# Patient Record
Sex: FEMALE
[De-identification: names, ages, dates, MRNs, and addresses within clinical notes are randomized per-mention and may not be internally consistent; named-entity substitution may affect disease eponyms.]

---

## 2019-06-25 PROBLEM — Z00.00 ENCOUNTER FOR PREVENTIVE HEALTH EXAMINATION: Status: ACTIVE | Noted: 2019-06-25

## 2019-07-23 ENCOUNTER — APPOINTMENT (OUTPATIENT)
Dept: VASCULAR SURGERY | Facility: CLINIC | Age: 59
End: 2019-07-23

## 2019-07-23 ENCOUNTER — APPOINTMENT (OUTPATIENT)
Dept: VASCULAR SURGERY | Facility: CLINIC | Age: 59
End: 2019-07-23
Payer: COMMERCIAL

## 2019-07-23 VITALS — HEIGHT: 69 IN | WEIGHT: 140 LBS | BODY MASS INDEX: 20.73 KG/M2

## 2019-07-23 DIAGNOSIS — Z87.39 PERSONAL HISTORY OF OTHER DISEASES OF THE MUSCULOSKELETAL SYSTEM AND CONNECTIVE TISSUE: ICD-10-CM

## 2019-07-23 DIAGNOSIS — Z82.49 FAMILY HISTORY OF ISCHEMIC HEART DISEASE AND OTHER DISEASES OF THE CIRCULATORY SYSTEM: ICD-10-CM

## 2019-07-23 DIAGNOSIS — R25.2 CRAMP AND SPASM: ICD-10-CM

## 2019-07-23 DIAGNOSIS — I83.10 VARICOSE VEINS OF UNSPECIFIED LOWER EXTREMITY WITH INFLAMMATION: ICD-10-CM

## 2019-07-23 PROCEDURE — 93970 EXTREMITY STUDY: CPT

## 2019-07-23 PROCEDURE — 99213 OFFICE O/P EST LOW 20 MIN: CPT

## 2019-07-23 NOTE — PROCEDURE
[FreeTextEntry1] : Rx given for thigh high support hose 20-30 mmHg - pt to follow up for b/l gsv evlt, possible stab phlebectomy

## 2019-07-23 NOTE — ADDENDUM
[FreeTextEntry1] : CEAP Classification:\par []     C0----No visible or palpable signs of venous disease\par []     C1----Telangiectasia or reticular veins\par x   C2----Varicose veins; distinguished from reticular veins by a diameter of 3mm or more.\par []     C3----Edema\par []     Y6y--Nbnsrba in skin and subcutaneous tissues secondary to CVD---Pigmentation or eczema\par []     Q3o--Hjhhvnz in skin and subcutaneous tissues secondary to CVD---Lipodermatosclerosis or atrophic joe\par []     C5----Healed venous ulcer\par []     C6----Active venous ulcer\par x   S ----Symptoms including ache, pain, tightness, skin irritation, heaviness, muscle cramps, and other venous dysfunction\par []     A ----Asymptomatic\par CEAP Sclore  =    C2, S\par \par Attribute                            Absent=0                    Mild=1                                   Moderate=2                                           Severe=3\par 1         Pain                          None                          Occasional                           Daily, moderate                                      Daily, severe\par          \par 1        Varicose Veins         None                          Few: branch VV                  Multiple: GS VV                                      Extensive: GS & LS   \par \par  0       Venous Edema         None                         Evening ankle only                Afternoon above ankle                          Morning above ankle\par \par  0       Skin Pigmentation      None or low             Diffuse, limited, old brown    Diffuse, lower 1/3, recent pigment        Above lower 1/3, and recent pigment\par \par  0       Inflammation              None                        Mild cellulitis                           Moderate cellulitis, lower 1/3                 Severe cellulitis, lower 1/3 & above\par \par   0      Induration                  None                        Focal (<5 cm)                        Medial or lateral, < lower 1/3                  Entire lower 1/3   \par \par  0       No.of active ulcers   0                              1                                             2                                                             >2\par \par  0       Active ulceration      None                       < 3 months                             > 3 months, < 1 year                              Not healed > 1 year\par \par   0      Active ulcer, size     None                       < 2 cm diameter                     2-6 cm diameter                                      > 6 cm diameter      \par \par  0       Compressive tx        No use/compliant    Intermittent use                      Wears most days                                   Full compliance\par \par \par 2      TOTAL (max 30 pts) Venous Clinical Severity Score\par

## 2019-07-23 NOTE — REASON FOR VISIT
[Initial Evaluation] : an initial evaluation [FreeTextEntry1] : She was referred by her PMD, Dr. Johan Zimmerman, for evaluation of varicose veins b/l legs.  Patient with c/o painful dilated branch varicose veins, rt>>left.  She does not wear compression stockings and no history of venous treatment.  Significant family history of varicose veins - mother and father.  B/L VLE today confirms no DVT, no SVT, + reflux Bilateral GSV.  Reviewed results of venous US and copy of report given to patient.  She has severe dilated branch varicose veins Bilateral thigh and Left posterior leg.  Recommend thigh high medical grade 20-30 mmHg compression stockings to be worn daily and during flights.  Provided patient with prescription. She would benefit from Bilateral GSV EVLT followed by Bilateral leg microphlebectomy.  She will call the office when she is ready to schedule EVLT procedures.

## 2019-07-23 NOTE — ASSESSMENT
[Arterial/Venous Disease] : arterial/venous disease [Other: _____] : [unfilled] [FreeTextEntry1] : Vascular surgeon discussed with the patient:\par Varicose veins are enlarged, twisted veins. Varicose veins are caused by increased blood pressure in the veins.  The blood moves towards the heart by 1-way valves in the veins. When the valves become weakened or damaged, blood can collect in the veins. This causes the veins to become enlarged. Sitting or standing for long periods can cause blood to pool in the leg veins, increasing the pressure within the veins. The veins can stretch from the increased pressure. This may weaken the walls of the veins and damage the valves.\par Varicose veins may be more common in some families (inherited).  Factors that may increase pressure include:  obesity, older age, being female, pregnancy, taking oral contraceptive pills or hormone replacement, being inactive, and/or smoking. \par The most common symptoms of varicose veins are sensations in the legs, such as a heavy feeling, burning, and/or aching. However, each individual may experience symptoms differently.  Other symptoms may include:  color changes in the skin, sores on the legs, or rash.  Severe varicose veins may eventually produce long-term mild swelling that can result in more serious skin and tissue problems, such as ulcers and non-healing sores.\par Varicose veins are diagnosed by a complete medical history, physical examination, and diagnostic studies for varicose veins including duplex ultrasound and color-flow imaging.  \par Medical treatment for varicose veins include:  compression stockings, sclerotherapy, endovenous laser ablation and/or surgical treatment microphlebectomy.  \par \par

## 2019-07-23 NOTE — PHYSICAL EXAM
[2+] : right 2+ [Ankle Swelling (On Exam)] : not present [Varicose Veins Of Lower Extremities] : bilaterally [Ankle Swelling Bilaterally] : severe [] : bilaterally [Purpura] : no purpura  [Petechiae] : no petechiae [No Rash or Lesion] : No rash or lesion [Skin Induration] : no induration [Skin Ulcer] : no ulcer [Alert] : alert [Oriented to Time] : oriented to time [Oriented to Person] : oriented to person [Oriented to Place] : oriented to place [de-identified] : thin wdwn nad [FreeTextEntry1] : b/l varicose vein branches both legs [Calm] : calm [de-identified] : as above [de-identified] : wnl

## 2019-09-12 ENCOUNTER — OTHER (OUTPATIENT)
Age: 59
End: 2019-09-12

## 2019-11-25 ENCOUNTER — CLINICAL ADVICE (OUTPATIENT)
Age: 59
End: 2019-11-25

## 2019-11-26 ENCOUNTER — APPOINTMENT (OUTPATIENT)
Dept: VASCULAR SURGERY | Facility: CLINIC | Age: 59
End: 2019-11-26
Payer: COMMERCIAL

## 2019-11-26 VITALS
TEMPERATURE: 99 F | HEART RATE: 48 BPM | DIASTOLIC BLOOD PRESSURE: 78 MMHG | OXYGEN SATURATION: 98 % | SYSTOLIC BLOOD PRESSURE: 125 MMHG

## 2019-11-26 PROCEDURE — 36478 ENDOVENOUS LASER 1ST VEIN: CPT | Mod: RT

## 2019-11-26 NOTE — PROCEDURE
[FreeTextEntry2] : \par Right Lower extremity varicose veins with inflammation, leg pain, leg swelling, and leg cramping.  Venous insufficiency, chronic venous hypertension and venous reflux. [FreeTextEntry1] : Right gsv  endovenous laser ablation. \par  [FreeTextEntry3] : Ms. TIFFANIE REDMAN is a 59 year year old F with a history of   right  lower extremity varicose veins previously seen in the office.  Ultrasound examination demonstrated reflux in the right gsv vein dumping into the patient’s varicosities in the right leg.  A trial of compression stockings, exercise, elevation, and pain medication was attempted without relief and as such, this patient was offered endovenous laser ablation therapy.  After explaining risks and benefits, informed consent was obtained and the patient agreed to proceed.  \par \par The patient was escorted to the procedure room and placed in the  supine  position on the examination table.  Laser safety glasses were placed on the patient.  The  right leg was prepped and draped in the usual sterile fashion and time-out was called.  A sonographic probe was placed into a sterile sheath and the right lower extremity was imaged.  The  right gsv   vein was identified under sonographic guidance and 1 mL of 1% lidocaine was administered subcutaneously using a 25G needle.  Using standard Seldinger technique, access to the rihgt gsv  vein with the needle and corresponding guidewire was obtained.  The 4Fr Naeem-Sheath introducer was advanced over the wire to the treatment location.  The position of the sheath tip is 2 cm below the Sapheno   []   Junction and verified using ultrasound guidance.  Dilator and guidewire removed.   NeverTouch laser fiber inserted into the sheath until the Fiber Stop Assembly came in contact with the end of the Sheath Hub.  Fiber stop assembly removed and pulled the sheath back and locked to the Sheath-Mark fitting.  Fiber location confirmed using ultrasound guidance.\par \par Local tumescent anesthesia under ultrasound guidance was administered to ensure delivery of just enough anesthesia, approximately 250 mL, to achieve thermal protection.\par Anesthesia:  Tumescent anesthesia.  Tumescent anesthesia:  250 mL 0.9% Sodium Chloride for injection poured into sterile blue basin and added 83 mL sterile injectable lidocaine 1% (without Epinephrine) using 60 mL syringe.\par \par Laser was set to continuous mode and adjusted to desired power.  Laser placed in Enable mode.  The laser was activated by depressing the foot pedal while withdrawing the fiber and sheath together at a rate of 1 cm per 5 seconds.  The operating of the laser was ceased by removing the foot from foot pedal when the fiber end was 2 - 3 cm from the access site as indicated by markers on the distal tip of the Naeem-Sheath Introducer.   The sheath and laser fiber was removed and manual pressure applied at access site for 5 minutes.  Post procedure, the vein was imaged and showed successful closure of the   right gsv  vein.  Dry dressing applied to access site, compression stocking 20 - 30 mm Hg applied to treated extremity.  The patient tolerated the procedure well with no complications.  Vital signs stable, patient was monitored throughout procedure.  The patient was escorted to the changing room.  \par \par EVLT procedure start time    341 pm\par EVLT procedure end time     356 pm \par Guzmán    9 \par Length  32   cm\par Energy  1358    Joule\par Time    151    sec\par \par Post-Op Instructions:  The following instructions were reviewed with the patient and a print out of the instructions provided to patient at time of visit:   1)  Compression stocking to be worn 24 hours per day x 7 days.  2)  Do not get the stocking wet for the first 3 days.  3)  Ambulation immediately following procedure and as much as possible for the first 7 days.  4)  Contact the office if any questions or concerns. 5)  Patient must follow-up within the first week for post procedure reflux study performed in office.  Reviewed with the patient the follow up visit is to ensure that there are no complications such as a deep vein thrombosis (DVT).  Patient acknowledged understanding of post-op instructions and was provided with print out of instructions at time of visit.  \par \par \par

## 2019-11-26 NOTE — REASON FOR VISIT
[Procedure: _________] : a [unfilled] procedure visit [FreeTextEntry1] : Patient has severe reflux right gsv and here for right gsv EVLT procedure.  TIFFANIE REDMAN proceeded with right gsv EVLT with no complications.  TIFFANIE REDMAN will follow up in one week for routine visit with VLE.

## 2019-11-26 NOTE — ADDENDUM
[FreeTextEntry1] : RN reviewed with the patient post-procedure endovenous laser ablation (EVLT) instructions, provided patient with printed copy of instructions along with Dr. Khalil's cell number, and advised patient to call for any questions or concerns.\par \par Post-Endovenous Laser Ablation Therapy (EVLT) \par \par You have completed your EVLT and are on your way to better lower extremity venous health.  A medical-grade compression stocking has been placed on your leg to provide important continued support and external compression following the procedure.  \par \par The stocking is to remain in place for the next 7 days and is to be worn 24 hours/day.  This means that you need to sleep with the stocking, too.\par \par For the first three days, you cannot get the stocking wet.  Protect the stocking from getting wet.  If it gets slightly wet, let it air dry or dry with a blow dryer.  After 3 days, you may remove the stocking only to shower/bathe but then-you’ll need to replace the stocking immediately afterwards.  After 7 days you may discontinue use the support hose. \par \par You should expect to feel tightness, pulling, aching in the leg where the treated vein underwent ablation.  This is normal and represents the desired inflammatory response and the closing of the vein. Most people experience the onset of this discomfort after 2-3 days.  It may last 1-2 weeks and will get progressively better each day.  Take Motrin or lbuprofen or Aleve to help ease the discomfort.  You may also notice bruising of the skin- this is also normal and will resolve without difficulty.  Most patients who are scheduled to undergo EVLT on both legs are comfortable and more than ready to proceed with the second procedure after 2 weeks.  If it is necessary to reschedule, please notify the office.\par \par For the first week following your EVLT, avoid heavy exercise.  However, it is important that you walk as much as possible immediately after the procedure and each day for the first week.  Walking outside or on an indoor treadmill for 1 hour are both acceptable.  It is critical NOT to go home and remain sedentary following the procedure.\par \par You should immediately call your doctor if you experience:  fever, redness/blanching of the skin, excessive pain, swelling of the leg or for any concerns that you want to discuss.  \par \par You must be seen in the doctor's office for a follow-up evaluation within the first week after your EVLT and you can expect to undergo a quick ultrasound exam in the office.  This is an important but brief visit to ensure that there are no complications such as a deep vein thrombosis (blood clot). \par  \par Please contact our office to schedule your follow up appointment [phone] if you do not already have a scheduled appointment.  \par \par Dr. Khalil’s cell phone number:  [phone]\par \par \par

## 2019-12-03 ENCOUNTER — APPOINTMENT (OUTPATIENT)
Dept: VASCULAR SURGERY | Facility: CLINIC | Age: 59
End: 2019-12-03
Payer: COMMERCIAL

## 2019-12-03 DIAGNOSIS — R25.2 CRAMP AND SPASM: ICD-10-CM

## 2019-12-03 PROCEDURE — 93971 EXTREMITY STUDY: CPT

## 2019-12-03 PROCEDURE — 99214 OFFICE O/P EST MOD 30 MIN: CPT | Mod: 25

## 2019-12-03 NOTE — ASSESSMENT
[Other: _____] : [unfilled] [FreeTextEntry1] : Right  leg stab phlebectomy (30311)\par \par Varicose vein of right lower extremities with inflammation (I83.11);  Varicose veins with complication (I83.893); Venous/reflux insufficiency(I87.2); Leg pain (right, left) (M79.604, M79.605); Swelling of limb (M79.89); Cramps of limb (R25.2); Chronic venous hypertension with other complication (I87.399)\par \par Ms. TIFFANIE REDMAN is a 59 year year old F with complaints of large painful right leg branch varicose veins.  The patient has tried medical grade support hose 20-30 mmHg, periodic elevation, exercise, avoidance of prolonged immobility and pain medication for several months without success.  She is s/p right gsv evlt proceure.The patient has persistent symptoms of leg pain, aches, cramps, burning, itching, restlessness, especially with prolonged standing and sitting - symptoms interfere with the ability to function well throughout the day.  Detailed bilateral leg venous ultrasound reveals no truncal vein reflux.  However, there is evidence of right   leg enlarged branch varicose veins.  The patient requires stab phlebectomy in the doctor’s office to remove the large painful varicose vein branches from the patient’s right leg. \par Reviewed with the patient that microphlebectomy procedure is a method of removing varicose veins on the surface of the legs.  It is done in the office under local anesthesia.  This procedure involves making tiny incisions through which the varicose veins are removed.  There is rarely pain, but an occasional sensation of "tugging and pulling," should be expected.  After treatment, a compression bandage is worn for 72 hours and the treated leg must remain completely dry.  Temporary bruising and swelling of the treated area is typical and is minimized with compression bandage.  The patient can walk immediately after surgery and carry on normal daily activities except for strenuous exercise and heavy lifting.  The patient must follow the activity restrictions and wear the compression bandage as directed.\par \par

## 2019-12-03 NOTE — PHYSICAL EXAM
[2+] : right 2+ [Ankle Swelling (On Exam)] : not present [Varicose Veins Of Lower Extremities] : bilaterally [Ankle Swelling Bilaterally] : severe [] : bilaterally [No Rash or Lesion] : No rash or lesion [Purpura] : no purpura  [Skin Induration] : no induration [Petechiae] : no petechiae [Skin Ulcer] : no ulcer [Alert] : alert [Oriented to Place] : oriented to place [Oriented to Person] : oriented to person [Calm] : calm [Oriented to Time] : oriented to time [de-identified] : thin wdwn nad [de-identified] : as above [de-identified] : wnl [FreeTextEntry1] : b/l varicose vein branches both legs =- right medial thigh bruising c/w recent rt gsv evlt

## 2019-12-03 NOTE — REASON FOR VISIT
[Follow-Up: _____] : a [unfilled] follow-up visit [FreeTextEntry1] : She is s/p right gsv  EVLT on  11/26/2019.  Patient is here today for routine follow up with right VLE.  She has mild ecchymosis noted right medial thigh; reviewed with her the bruising is normal and will resolve over time.  VLE today confirms no DVT and right gsv closed consistent with prior laser ablation.  She has dilated branch varicose veins right leg that has slightly decreased in size since evlt procedure.  She would benefit from right leg microphlebectomy and would like to proceed procedure in 2 weeks if the veins right leg do not get smaller.  She will call to schedule appointment.

## 2019-12-11 ENCOUNTER — APPOINTMENT (OUTPATIENT)
Dept: VASCULAR SURGERY | Facility: CLINIC | Age: 59
End: 2019-12-11

## 2019-12-17 ENCOUNTER — APPOINTMENT (OUTPATIENT)
Dept: VASCULAR SURGERY | Facility: CLINIC | Age: 59
End: 2019-12-17

## 2019-12-18 ENCOUNTER — APPOINTMENT (OUTPATIENT)
Dept: VASCULAR SURGERY | Facility: CLINIC | Age: 59
End: 2019-12-18

## 2023-09-26 ENCOUNTER — APPOINTMENT (OUTPATIENT)
Dept: ORTHOPEDIC SURGERY | Facility: CLINIC | Age: 63
End: 2023-09-26
Payer: COMMERCIAL

## 2023-09-26 VITALS — BODY MASS INDEX: 19.99 KG/M2 | RESPIRATION RATE: 16 BRPM | WEIGHT: 135 LBS | HEIGHT: 69 IN

## 2023-09-26 DIAGNOSIS — M65.342 TRIGGER FINGER, LEFT RING FINGER: ICD-10-CM

## 2023-09-26 DIAGNOSIS — M72.0 PALMAR FASCIAL FIBROMATOSIS [DUPUYTREN]: ICD-10-CM

## 2023-09-26 PROCEDURE — 20550 NJX 1 TENDON SHEATH/LIGAMENT: CPT

## 2023-09-26 PROCEDURE — 73120 X-RAY EXAM OF HAND: CPT | Mod: 50

## 2023-09-26 PROCEDURE — 99203 OFFICE O/P NEW LOW 30 MIN: CPT | Mod: 25

## 2023-11-30 ENCOUNTER — APPOINTMENT (OUTPATIENT)
Dept: VASCULAR SURGERY | Facility: CLINIC | Age: 63
End: 2023-11-30
Payer: COMMERCIAL

## 2023-11-30 VITALS
HEART RATE: 55 BPM | SYSTOLIC BLOOD PRESSURE: 110 MMHG | OXYGEN SATURATION: 99 % | TEMPERATURE: 97 F | WEIGHT: 135 LBS | BODY MASS INDEX: 19.99 KG/M2 | HEIGHT: 69 IN | DIASTOLIC BLOOD PRESSURE: 70 MMHG

## 2023-11-30 DIAGNOSIS — I83.813 VARICOSE VEINS OF BILATERAL LOWER EXTREMITIES WITH PAIN: ICD-10-CM

## 2023-11-30 DIAGNOSIS — I83.893 VARICOSE VEINS OF BILATERAL LOWER EXTREMITIES WITH OTHER COMPLICATIONS: ICD-10-CM

## 2023-11-30 PROCEDURE — 93970 EXTREMITY STUDY: CPT

## 2023-11-30 PROCEDURE — 99204 OFFICE O/P NEW MOD 45 MIN: CPT | Mod: 25

## 2024-01-11 ENCOUNTER — APPOINTMENT (OUTPATIENT)
Dept: VASCULAR SURGERY | Facility: CLINIC | Age: 64
End: 2024-01-11

## 2024-01-18 ENCOUNTER — APPOINTMENT (OUTPATIENT)
Dept: VASCULAR SURGERY | Facility: CLINIC | Age: 64
End: 2024-01-18

## 2024-01-25 ENCOUNTER — APPOINTMENT (OUTPATIENT)
Dept: VASCULAR SURGERY | Facility: CLINIC | Age: 64
End: 2024-01-25

## 2024-02-01 ENCOUNTER — APPOINTMENT (OUTPATIENT)
Dept: VASCULAR SURGERY | Facility: CLINIC | Age: 64
End: 2024-02-01

## 2024-02-08 ENCOUNTER — APPOINTMENT (OUTPATIENT)
Dept: VASCULAR SURGERY | Facility: CLINIC | Age: 64
End: 2024-02-08

## 2024-02-15 ENCOUNTER — APPOINTMENT (OUTPATIENT)
Dept: VASCULAR SURGERY | Facility: CLINIC | Age: 64
End: 2024-02-15

## 2024-02-22 ENCOUNTER — APPOINTMENT (OUTPATIENT)
Dept: VASCULAR SURGERY | Facility: CLINIC | Age: 64
End: 2024-02-22

## 2024-02-29 ENCOUNTER — APPOINTMENT (OUTPATIENT)
Dept: VASCULAR SURGERY | Facility: CLINIC | Age: 64
End: 2024-02-29
Payer: MEDICARE

## 2024-02-29 VITALS
TEMPERATURE: 97.2 F | DIASTOLIC BLOOD PRESSURE: 71 MMHG | SYSTOLIC BLOOD PRESSURE: 107 MMHG | HEART RATE: 69 BPM | OXYGEN SATURATION: 98 %

## 2024-02-29 DIAGNOSIS — I87.2 VENOUS INSUFFICIENCY (CHRONIC) (PERIPHERAL): ICD-10-CM

## 2024-02-29 PROCEDURE — 36475 ENDOVENOUS RF 1ST VEIN: CPT | Mod: RT

## 2024-02-29 NOTE — REASON FOR VISIT
[Procedure: _________] : a [unfilled] procedure visit [FreeTextEntry1] : Patient has severe reflux right below knee gsv and here for right gsv RFA procedure.  TIFFANIE REDMAN proceeded with right below knee gsv RFA with no complications.  TIFFANIE REDMAN will follow up in one week for routine visit with VLE.

## 2024-02-29 NOTE — PROCEDURE
[FreeTextEntry1] : right below knee gsv RFA [FreeTextEntry3] :  Right lower extremity varicose veins with inflammation, fatigue, heaviness, pain, swelling, cramping heaviness, and dermatitis.  Pt was diagnosed with Chronic Venous insufficiency (CVI), chronic venous hypertension and venous reflux which requires thermal ablation with endovenous radiofrequency to treat the truncal/axial vein reflux (Radiofrequency Ablation, RFA).  Ultrasound examination demonstrated reflux in the supine vein with reflux into the patient's varicosities.  A trial of compression stockings, exercise, elevation, and pain medication was attempted without relief and as such, this patient was offered RFA as definitive treatment.  After explaining risks and benefits, informed consent was obtained and the patient was brought to the treatment room.  The patient was escorted to the procedure room and placed in the supine position on the examination table.   The right leg was prepped and draped in the usual sterile fashion and time-out was called.  A sonographic probe was placed into a sterile sheath and the lower extremity was imaged. A suitable access site in the superficial truncal vein to be treated was identified using sonographic guidance and marked at the skin level. 1 mL of 1% lidocaine without epinephrine was administered subcutaneously using a 25G needle.  The RF catheter, dilator and introducer were flushed with saline and wiped down and placed on the sterile field.  Using standard Seldinger technique, access to the saphenous vein was obtained with the needle and corresponding guidewire.  The needle was removed and the 7Fr introducer sheath with dilator was advanced over the wire into the vein. The guide wire and dilator were removed and the RFA fiber catheter was placed into the vein through the introducer sheath.  The position of the RFA  2 cm below the Sapheno-femoral Junction was verified using ultrasound guidance.       Local tumescent anesthesia, under ultrasound guidance, was administered circumferentially around the entire length of vein in the perivenous compartment to ensure a complete "halo" of anesthetic around the vein.  Tumescent anesthesia:  250 mL 0.9% Sodium Chloride was poured into a sterile blue basin and 50 cc of sterile injectable lidocaine 1% (without Epinephrine) was added using a 20 mL syringe.  The RFA location was again confirmed to be appropriate and the RFA energy was applied by pressing the hand button.  The proximal 3 cm was treated for 2 cycles at 20 seconds. The RFA fiber was then withdrawn in sequential 3 cm segments and each section was treated with one cycle of 20 seconds thermal energy application. The system was monitored to ensure that the vein wall temperature was within 120 +/- 5 degrees C and the generator output was well below its maximum starting power.   The operation of the RFA was ceased when the fiber end was at its most distal marker as indicated by the distal tip rock lines.  The sheath and RFA fiber catheter were removed together and manual pressure was applied at access site for hemostasis.  Post procedure, the vein was imaged and showed successful closure along the entire treated length with a patent proximal most 2-3 cm and sapheno-femoral junction.  Dry bandaid was applied to the access site and a compression stocking 20 - 30 mm Hg was applied to the treated extremity.  The patient tolerated the procedure well with no complications.  Vital signs stable, patient was monitored throughout procedure.    Refer to procedure sheet for procedure start and end time, # of total cycles,  total treated vein length treated,  and total treatment duration time documentation.   Post-Op Instructions:  The following instructions were reviewed with the patient and a print out of the instructions provided to patient at time of visit:   1)  Compression stocking to be worn 24 hours per day x 7 days.  2)  Do not get the stocking wet for the first 3 days.  3)  Ambulation immediately following procedure and as much as possible for the first 7 days.  4)  Contact the office if any questions or concerns. 5)  Patient must follow-up within the first week for post procedure reflux study performed in office.  Reviewed with the patient the follow up visit is to ensure that there are no complications such as a deep vein thrombosis (DVT) or endovenous heat-induced thrombus (EHIT).  Patient acknowledged understanding of post-op instructions and was provided with print out of instructions at time of visit.    Reviewed with the patient post-procedure RFA  instructions, provided patient with printed copy of instructions along with Dr. Khalil's cell phone number:  [phone], and advised patient to call for any questions or concerns prior to follow up visit.  All questions answered.

## 2024-03-07 ENCOUNTER — APPOINTMENT (OUTPATIENT)
Dept: VASCULAR SURGERY | Facility: CLINIC | Age: 64
End: 2024-03-07
Payer: MEDICARE

## 2024-03-07 VITALS
HEART RATE: 59 BPM | TEMPERATURE: 97 F | DIASTOLIC BLOOD PRESSURE: 75 MMHG | SYSTOLIC BLOOD PRESSURE: 125 MMHG | OXYGEN SATURATION: 99 %

## 2024-03-07 DIAGNOSIS — I83.891 VARICOSE VEINS OF RIGHT LOWER EXTREMITY WITH OTHER COMPLICATIONS: ICD-10-CM

## 2024-03-07 DIAGNOSIS — I83.11 VARICOSE VEINS OF RIGHT LOWER EXTREMITY WITH INFLAMMATION: ICD-10-CM

## 2024-03-07 DIAGNOSIS — I83.811 VARICOSE VEINS OF RIGHT LOWER EXTREMITY WITH PAIN: ICD-10-CM

## 2024-03-07 PROCEDURE — 93971 EXTREMITY STUDY: CPT | Mod: RT

## 2024-03-07 PROCEDURE — 99213 OFFICE O/P EST LOW 20 MIN: CPT | Mod: 25

## 2024-03-07 NOTE — PHYSICAL EXAM
[2+] : left 2+ [Ankle Swelling (On Exam)] : not present [Varicose Veins Of Lower Extremities] : bilaterally [Ankle Swelling Bilaterally] : severe [] : bilaterally [No Rash or Lesion] : No rash or lesion [Purpura] : no purpura  [Skin Ulcer] : no ulcer [Petechiae] : no petechiae [Skin Induration] : no induration [Alert] : alert [Oriented to Person] : oriented to person [Oriented to Place] : oriented to place [Oriented to Time] : oriented to time [Calm] : calm [FreeTextEntry1] : b/l varicose vein branches both legs  [de-identified] : thin wdwn nad [de-identified] : wnl [de-identified] : as above

## 2024-03-07 NOTE — REASON FOR VISIT
[Follow-Up: _____] : a [unfilled] follow-up visit [FreeTextEntry1] : She is s/p right gsv RFA.  Patient is here today for routine one week follow up visit with ultrasound.  Right leg ultrasound confirms no dvt, no HIIT, no truncal reflux and right gsv is closed as desired. Pt to return for left gsv rfa 3/21/245 - will also need b/l stab phlebectomy.

## 2024-03-07 NOTE — DATA REVIEWED
[FreeTextEntry1] : rvt performed rt gsv vle s/p recent rfa - gsv closed no dvt no ehit rt sf junction patent and compressible

## 2024-03-19 ENCOUNTER — APPOINTMENT (OUTPATIENT)
Dept: VASCULAR SURGERY | Facility: CLINIC | Age: 64
End: 2024-03-19

## 2024-03-26 ENCOUNTER — APPOINTMENT (OUTPATIENT)
Dept: VASCULAR SURGERY | Facility: CLINIC | Age: 64
End: 2024-03-26

## 2024-08-12 ENCOUNTER — APPOINTMENT (OUTPATIENT)
Dept: VASCULAR SURGERY | Facility: CLINIC | Age: 64
End: 2024-08-12
Payer: MEDICARE

## 2024-08-12 DIAGNOSIS — R20.0 ANESTHESIA OF SKIN: ICD-10-CM

## 2024-08-12 DIAGNOSIS — R20.2 ANESTHESIA OF SKIN: ICD-10-CM

## 2024-08-12 DIAGNOSIS — I83.813 VARICOSE VEINS OF BILATERAL LOWER EXTREMITIES WITH PAIN: ICD-10-CM

## 2024-08-12 DIAGNOSIS — I83.893 VARICOSE VEINS OF BILATERAL LOWER EXTREMITIES WITH OTHER COMPLICATIONS: ICD-10-CM

## 2024-08-12 DIAGNOSIS — I87.2 VENOUS INSUFFICIENCY (CHRONIC) (PERIPHERAL): ICD-10-CM

## 2024-08-12 PROCEDURE — 99213 OFFICE O/P EST LOW 20 MIN: CPT

## 2024-08-12 NOTE — PHYSICAL EXAM
[2+] : left 2+ [Ankle Swelling (On Exam)] : not present [Varicose Veins Of Lower Extremities] : bilaterally [Ankle Swelling Bilaterally] : severe [] : bilaterally [No Rash or Lesion] : No rash or lesion [Purpura] : no purpura  [Petechiae] : no petechiae [Skin Ulcer] : no ulcer [Skin Induration] : no induration [Alert] : alert [Oriented to Person] : oriented to person [Oriented to Place] : oriented to place [Oriented to Time] : oriented to time [Calm] : calm [de-identified] : thin wdwn nad [FreeTextEntry1] : b/l diffuse large varicose vein branches both legs  [de-identified] : wnl [de-identified] : as above

## 2024-08-12 NOTE — ASSESSMENT
[FreeTextEntry1] : pt to call when ready to proceed with left leg thermal ablation and b/l leg stab phlebectomy

## 2024-11-25 ENCOUNTER — APPOINTMENT (OUTPATIENT)
Dept: VASCULAR SURGERY | Facility: CLINIC | Age: 64
End: 2024-11-25
Payer: MEDICARE

## 2024-11-25 VITALS
DIASTOLIC BLOOD PRESSURE: 78 MMHG | OXYGEN SATURATION: 97 % | TEMPERATURE: 98.3 F | HEART RATE: 69 BPM | SYSTOLIC BLOOD PRESSURE: 119 MMHG

## 2024-11-25 DIAGNOSIS — I87.2 VENOUS INSUFFICIENCY (CHRONIC) (PERIPHERAL): ICD-10-CM

## 2024-11-25 PROCEDURE — 36475 ENDOVENOUS RF 1ST VEIN: CPT | Mod: LT

## 2024-12-03 ENCOUNTER — APPOINTMENT (OUTPATIENT)
Dept: VASCULAR SURGERY | Facility: CLINIC | Age: 64
End: 2024-12-03
Payer: MEDICARE

## 2024-12-03 VITALS
TEMPERATURE: 97 F | OXYGEN SATURATION: 98 % | SYSTOLIC BLOOD PRESSURE: 102 MMHG | DIASTOLIC BLOOD PRESSURE: 61 MMHG | HEART RATE: 65 BPM

## 2024-12-03 DIAGNOSIS — I83.812 VARICOSE VEINS OF LEFT LOWER EXTREMITY WITH PAIN: ICD-10-CM

## 2024-12-03 DIAGNOSIS — I83.892 VARICOSE VEINS OF LEFT LOWER EXTREMITY WITH OTHER COMPLICATIONS: ICD-10-CM

## 2024-12-03 PROCEDURE — 99213 OFFICE O/P EST LOW 20 MIN: CPT | Mod: 25

## 2024-12-03 PROCEDURE — 93971 EXTREMITY STUDY: CPT | Mod: LT

## 2025-01-09 ENCOUNTER — APPOINTMENT (OUTPATIENT)
Dept: VASCULAR SURGERY | Facility: CLINIC | Age: 65
End: 2025-01-09
Payer: MEDICARE

## 2025-01-09 VITALS
OXYGEN SATURATION: 99 % | SYSTOLIC BLOOD PRESSURE: 124 MMHG | TEMPERATURE: 97.2 F | DIASTOLIC BLOOD PRESSURE: 79 MMHG | HEART RATE: 76 BPM

## 2025-01-09 DIAGNOSIS — I83.811 VARICOSE VEINS OF RIGHT LOWER EXTREMITY WITH PAIN: ICD-10-CM

## 2025-01-09 DIAGNOSIS — I83.891 VARICOSE VEINS OF RIGHT LOWER EXTREMITY WITH OTHER COMPLICATIONS: ICD-10-CM

## 2025-01-09 PROCEDURE — 37765 STAB PHLEB VEINS XTR 10-20: CPT | Mod: RT

## 2025-01-23 ENCOUNTER — APPOINTMENT (OUTPATIENT)
Dept: VASCULAR SURGERY | Facility: CLINIC | Age: 65
End: 2025-01-23
Payer: MEDICARE

## 2025-01-23 VITALS
TEMPERATURE: 97.1 F | HEART RATE: 70 BPM | DIASTOLIC BLOOD PRESSURE: 71 MMHG | OXYGEN SATURATION: 99 % | SYSTOLIC BLOOD PRESSURE: 109 MMHG

## 2025-01-23 DIAGNOSIS — I83.891 VARICOSE VEINS OF RIGHT LOWER EXTREMITY WITH OTHER COMPLICATIONS: ICD-10-CM

## 2025-01-23 DIAGNOSIS — I83.811 VARICOSE VEINS OF RIGHT LOWER EXTREMITY WITH PAIN: ICD-10-CM

## 2025-01-23 PROCEDURE — 99213 OFFICE O/P EST LOW 20 MIN: CPT

## 2025-02-05 ENCOUNTER — APPOINTMENT (OUTPATIENT)
Dept: VASCULAR SURGERY | Facility: CLINIC | Age: 65
End: 2025-02-05
Payer: MEDICARE

## 2025-02-05 VITALS
HEART RATE: 62 BPM | DIASTOLIC BLOOD PRESSURE: 85 MMHG | OXYGEN SATURATION: 98 % | SYSTOLIC BLOOD PRESSURE: 135 MMHG | TEMPERATURE: 97.1 F

## 2025-02-05 DIAGNOSIS — I83.812 VARICOSE VEINS OF LEFT LOWER EXTREMITY WITH PAIN: ICD-10-CM

## 2025-02-05 DIAGNOSIS — I83.892 VARICOSE VEINS OF LEFT LOWER EXTREMITY WITH OTHER COMPLICATIONS: ICD-10-CM

## 2025-02-05 PROCEDURE — 37765 STAB PHLEB VEINS XTR 10-20: CPT | Mod: LT

## 2025-02-19 ENCOUNTER — APPOINTMENT (OUTPATIENT)
Dept: VASCULAR SURGERY | Facility: CLINIC | Age: 65
End: 2025-02-19
Payer: MEDICARE

## 2025-02-19 VITALS
DIASTOLIC BLOOD PRESSURE: 100 MMHG | SYSTOLIC BLOOD PRESSURE: 138 MMHG | TEMPERATURE: 96.5 F | HEART RATE: 71 BPM | OXYGEN SATURATION: 98 %

## 2025-02-19 DIAGNOSIS — I83.892 VARICOSE VEINS OF LEFT LOWER EXTREMITY WITH OTHER COMPLICATIONS: ICD-10-CM

## 2025-02-19 DIAGNOSIS — I83.812 VARICOSE VEINS OF LEFT LOWER EXTREMITY WITH PAIN: ICD-10-CM

## 2025-02-19 PROCEDURE — 99213 OFFICE O/P EST LOW 20 MIN: CPT

## 2025-03-06 ENCOUNTER — APPOINTMENT (OUTPATIENT)
Dept: VASCULAR SURGERY | Facility: CLINIC | Age: 65
End: 2025-03-06
Payer: MEDICARE

## 2025-03-06 VITALS
OXYGEN SATURATION: 97 % | HEART RATE: 60 BPM | DIASTOLIC BLOOD PRESSURE: 66 MMHG | TEMPERATURE: 97.3 F | SYSTOLIC BLOOD PRESSURE: 96 MMHG

## 2025-03-06 DIAGNOSIS — I83.891 VARICOSE VEINS OF RIGHT LOWER EXTREMITY WITH OTHER COMPLICATIONS: ICD-10-CM

## 2025-03-06 DIAGNOSIS — I83.811 VARICOSE VEINS OF RIGHT LOWER EXTREMITY WITH PAIN: ICD-10-CM

## 2025-03-06 PROCEDURE — 37765 STAB PHLEB VEINS XTR 10-20: CPT | Mod: RT

## 2025-03-13 ENCOUNTER — APPOINTMENT (OUTPATIENT)
Dept: VASCULAR SURGERY | Facility: CLINIC | Age: 65
End: 2025-03-13

## 2025-03-19 ENCOUNTER — APPOINTMENT (OUTPATIENT)
Dept: VASCULAR SURGERY | Facility: CLINIC | Age: 65
End: 2025-03-19
Payer: MEDICARE

## 2025-03-19 DIAGNOSIS — I83.891 VARICOSE VEINS OF RIGHT LOWER EXTREMITY WITH OTHER COMPLICATIONS: ICD-10-CM

## 2025-03-19 DIAGNOSIS — I83.811 VARICOSE VEINS OF RIGHT LOWER EXTREMITY WITH PAIN: ICD-10-CM

## 2025-03-19 PROCEDURE — 99213 OFFICE O/P EST LOW 20 MIN: CPT

## 2025-04-10 ENCOUNTER — APPOINTMENT (OUTPATIENT)
Dept: VASCULAR SURGERY | Facility: CLINIC | Age: 65
End: 2025-04-10
Payer: MEDICARE

## 2025-04-10 VITALS
OXYGEN SATURATION: 99 % | DIASTOLIC BLOOD PRESSURE: 84 MMHG | TEMPERATURE: 97.1 F | SYSTOLIC BLOOD PRESSURE: 144 MMHG | HEART RATE: 64 BPM

## 2025-04-10 DIAGNOSIS — I83.811 VARICOSE VEINS OF RIGHT LOWER EXTREMITY WITH PAIN: ICD-10-CM

## 2025-04-10 PROCEDURE — 99212 OFFICE O/P EST SF 10 MIN: CPT
